# Patient Record
Sex: FEMALE | Race: BLACK OR AFRICAN AMERICAN | Employment: FULL TIME | URBAN - METROPOLITAN AREA
[De-identification: names, ages, dates, MRNs, and addresses within clinical notes are randomized per-mention and may not be internally consistent; named-entity substitution may affect disease eponyms.]

---

## 2020-06-22 ENCOUNTER — HOSPITAL ENCOUNTER (OUTPATIENT)
Dept: PHYSICAL THERAPY | Age: 50
Setting detail: THERAPIES SERIES
Discharge: HOME | End: 2020-06-22
Payer: COMMERCIAL

## 2020-06-22 PROCEDURE — 97110 THERAPEUTIC EXERCISES: CPT

## 2020-06-22 PROCEDURE — 97161 PT EVAL LOW COMPLEX 20 MIN: CPT

## 2020-06-23 NOTE — CONSULTS
[x] Demo  [x] Written  Comprehension of Education:  [x] Verbalizes understanding. [x] Demonstrates understanding. [x] Needs Review. [] Demonstrates/verbalizes understanding of HEP/Ed previously given. Treatment Plan:  [x] Therapeutic Exercise   45875  [x] Vasopneumatic cold with compression  97436    [x] Therapeutic Activity  52199 [x] Cold/hotpack    [x] Gait Training   66769 [] Lumbar/Cervical Traction  [id number]   [x] Neuromuscular Re-education  [id number] [] Electrical Stimulation Unattended  30976   [x] Manual Therapy    07030 [] Electrical Stimulation Attended  [id number]   [] Iontophoresis: 4 mg/mL Dexamethasone Sodium Phosphate  mAmin  83300 []  Medication allergies reviewed for use of   Dexamethasone Sodium Phosphate 4mg/ml with iontophoresis treatments. Pt is not allergic.        Frequency:  2x/week for 12 visits    Todays Treatment:  Precautions:  Modalities:   Exercises:  Exercise Reps/ Time Weight/ Level Comments   4 way ankle 10x blue Band issued   Step calf stretch  10x     Wall calf stretch  10x     R SLS 3x max                 Other:    Specific Instructions for next treatment: R ankle and foot strengthening, Gastroc mobility, balance training, manual ankle mobs      Evaluation Complexity:  History (Personal factors, comorbidities) [] 0 [x] 1-2 [] 3+   Exam (limitations, restrictions) [] 1-2 [x] 3 [] 4+   Clinical presentation (progression) [x] Stable [] Evolving  [] Unstable   Decision Making [x] Low [] Moderate [] High    [x] Low Complexity [] Moderate Complexity [] High Complexity       Treatment Charges: Mins Units   [x] Evaluation       [x]  Low       []  Moderate       []  High 25 1   []  Modalities     [x]  Ther Exercise 15 1   []  Manual Therapy     []  Ther Activities     []  Aquatics     []  Vasocompression     []  Other       TOTAL TREATMENT TIME: 15    Time in:1700   Time out:1740    Electronically signed by: Silvino Watt, PT        Physician

## 2020-06-25 ENCOUNTER — HOSPITAL ENCOUNTER (OUTPATIENT)
Dept: PHYSICAL THERAPY | Age: 50
Setting detail: THERAPIES SERIES
Discharge: HOME | End: 2020-06-25
Payer: COMMERCIAL

## 2020-06-25 PROCEDURE — 97110 THERAPEUTIC EXERCISES: CPT

## 2020-06-29 ENCOUNTER — HOSPITAL ENCOUNTER (OUTPATIENT)
Dept: PHYSICAL THERAPY | Age: 50
Setting detail: THERAPIES SERIES
Discharge: HOME | End: 2020-06-29
Payer: COMMERCIAL

## 2020-06-29 PROCEDURE — 97110 THERAPEUTIC EXERCISES: CPT

## 2020-06-29 NOTE — FLOWSHEET NOTE
[x] [facility]  [address]  [phone]  F: [phone]     Physical Therapy Daily Treatment Note    Date:  2020  Patient Name:  Rosa Elena Vicente      :  1970    MRN: [id number]  Physician:  Yi Mcdonough DO                       Insurance: [employer]  Medical Diagnosis: Pain of Right ankle joint M25.571                      Rehab Codes: M25.571, R26.2  Onset Date:6/10/20       Visit# / total visits: 3    Cancels/No Shows: 0/0    Subjective:    Pain:  [x] Yes  [] No Location: R ankle   Pain Rating: (0-10 scale) 4/10  Pain altered Tx:  [] No  [] Yes  Action:  Comments: medial ankle/foot pain noted. Objective:  Modalities:   Precautions:  Exercises:  Exercise Reps/ Time Weight/ Level Comments   SCIFIT 5 min            Standing       Gastroc stretch  On Wedge 3x30\"     Soleus stretch  3x30\" 6 inch On step    Calf raises 2x20x      SL BAPS 20x ea L2    Rockerboard 20x ea L2    SLS 5x max  Shoe off, avg 10-15 sec   SLS with March  20x  Shoe off          Mat      Towel scrunches 20x     Big toe extension  20x     Toes 2-5 extension  20x     4 way ankle  20x ea blue                Other:      Treatment Charges: Mins Units   []  Modalities     [x]  Ther Exercise 40 3   []  Manual Therapy     []  Ther Activities     []  Aquatics     []  Vasocompression     []  Other     Total Treatment time 40 3       Assessment: [x] Progressing toward goals. Some pain in medial ankle with single leg exercises. Dues to reinforce medial arch support, as pt is quite flat footed. Pt educated on use of orthotics for medial arch support. Given written instructions to obtain over the counter Power Step or similar product. [] No change. [] Other:  [x] Patient would continue to benefit from skilled physical therapy services in order to: improve Ankle stability to improve functional ambulation.      STG/LTG  STG: (to be met in 8 treatments)  1. ? Pain: 2/10 Right ankle pain with ambulation. 2. ? ROM: R ankle dorsiflexion to 20 degrees to improve gait kinematics. 3. ? Strength: 5/5 R ankle inversion to improve medial ankle support. 4. ? Function: FAAM score to 78% functional or better to improve ADLs. 5. Independent with Home Exercise Programs     LTG: (to be met in 12 treatments)  1. Patient will be able to ambulate 3 miles or more without foot/ankle pain. 2. R SLS of 20 seconds or better to demonstrate improves ankle stability.      Pt. Education:  [x] Yes  [] No  [x] Reviewed Prior HEP/Ed  Method of Education: [x] Verbal  [x] Demo  [] Written  Comprehension of Education:  [x] Verbalizes understanding. [x] Demonstrates understanding. [x] Needs review. [] Demonstrates/verbalizes HEP/Ed previously given. Plan: [x] Continue current frequency toward long and short term goals.     [x] Specific Instructions for subsequent treatments: add SL calf raises      Time In: 2525            Time Out: 2280      Electronically signed by:  Mariel Kaiser PTA

## 2020-07-01 ENCOUNTER — HOSPITAL ENCOUNTER (OUTPATIENT)
Dept: PHYSICAL THERAPY | Age: 50
Setting detail: THERAPIES SERIES
Discharge: HOME | End: 2020-07-01
Payer: COMMERCIAL

## 2020-07-01 PROCEDURE — 97110 THERAPEUTIC EXERCISES: CPT

## 2020-07-01 NOTE — FLOWSHEET NOTE
[x] [facility] [address]  [address]  [phone]  F: [phone]     Physical Therapy Daily Treatment Note    Date:  2020  Patient Name:  Jhonatan Mark      :  1970    MRN: [id number]  Physician:  Jose Mulligan DO                       Insurance: [employer]  Medical Diagnosis: Pain of Right ankle joint M25.571                      Rehab Codes: M25.571, R26.2  Onset Date:6/10/20       Visit# / total visits:     Cancels/No Shows: 0/0    Subjective:    Pain:  [x] Yes  [] No Location: R ankle   Pain Rating: (0-10 scale) 4/10  Pain altered Tx:  [] No  [] Yes  Action:  Comments: medial ankle/foot pain noted. Objective:  Modalities:   Precautions:  Exercises:  Exercise Reps/ Time Weight/ Level Comments   SCIFIT 5 min            Standing       Gastroc stretch  On Wedge 3x30\"     Soleus stretch  3x30\" 6 inch On step    Calf raises 2x20x      SL BAPS 20x ea L2    Rockerboard 20x ea L2    SLS 5x max  Shoe off, avg 10-15 sec   SLS with March  20x  Shoe off          Mat      Seated calf raise 2x20x     Towel scrunches 20x     Big toe extension  20x     Toes 2-5 extension  20x     4 way ankle  20x ea blue                Other:      Treatment Charges: Mins Units   []  Modalities     [x]  Ther Exercise 40 3   []  Manual Therapy     []  Ther Activities     []  Aquatics     []  Vasocompression     []  Other     Total Treatment time 40 3       Assessment: [x] Progressing toward goals. Fair tolerance to exercises. Calf raises are very weak, added seated version to improve soleus strength. [] No change. [] Other:  [x] Patient would continue to benefit from skilled physical therapy services in order to: improve Ankle stability to improve functional ambulation. STG/LTG  STG: (to be met in 8 treatments)  1. ? Pain: 2/10 Right ankle pain with ambulation. 2. ? ROM: R ankle dorsiflexion to 20 degrees to improve gait kinematics. 3. ? Strength: 5/5 R ankle inversion to improve medial ankle support. 4. ? Function: FAAM score to 78% functional or better to improve ADLs. 5. Independent with Home Exercise Programs     LTG: (to be met in 12 treatments)  1. Patient will be able to ambulate 3 miles or more without foot/ankle pain. 2. R SLS of 20 seconds or better to demonstrate improves ankle stability.      Pt. Education:  [x] Yes  [] No  [x] Reviewed Prior HEP/Ed  Method of Education: [x] Verbal  [x] Demo  [] Written  Comprehension of Education:  [x] Verbalizes understanding. [x] Demonstrates understanding. [x] Needs review. [] Demonstrates/verbalizes HEP/Ed previously given. Plan: [x] Continue current frequency toward long and short term goals.     [x] Specific Instructions for subsequent treatments: add SL calf raises      Time In: 1640           Time Out: 625 Deandre Peterson      Electronically signed by:  Meli Palencia, PT

## 2020-07-07 ENCOUNTER — HOSPITAL ENCOUNTER (OUTPATIENT)
Dept: PHYSICAL THERAPY | Age: 50
Setting detail: THERAPIES SERIES
Discharge: HOME | End: 2020-07-07
Payer: COMMERCIAL

## 2020-07-07 PROCEDURE — 97110 THERAPEUTIC EXERCISES: CPT

## 2020-07-07 NOTE — FLOWSHEET NOTE
[x] [facility] &  Therapy  [address]  [phone]  F: [phone]     Physical Therapy Daily Treatment Note    Date:  2020  Patient Name:  Pam Coto      :  1970    MRN: [id number]  Physician:  Keyla Riley DO                       Insurance: [employer]  Medical Diagnosis: Pain of Right ankle joint M25.571                      Rehab Codes: M25.571, R26.2  Onset Date:6/10/20       Visit# / total visits:     Cancels/No Shows: 0/0    Subjective:    Pain:  [x] Yes  [] No Location: R ankle   Pain Rating: (0-10 scale) 3/10  Pain altered Tx:  [] No  [] Yes  Action:  Comments: Patient reports improved ankle pain this date. Objective:  Modalities:   Precautions:   Exercises:  Exercise Reps/ Time Weight/ Level Comments   SCIFIT 5 min  L3           Standing       Gastroc stretch  On Wedge 3x30\"     Soleus stretch  3x30\" 6 inch On step    Calf raises 2x20x      SL BAPS 20x ea L2    Rockerboard 20x ea L2    SLS 5x max  Shoe off, avg 10-15 sec   SLS with March  20x  Shoe off    Power strides 15x 6 in    TG squats      TG SL calf raises                  Mat      Seated calf raise 2x20x     Towel scrunches 20x     Big toe extension  20x     Toes 2-5 extension  20x     4 way ankle  20x ea blue    Short foot 20x           Other:      Treatment Charges: Mins Units   []  Modalities     [x]  Ther Exercise 40 3   []  Manual Therapy     []  Ther Activities     []  Aquatics     []  Vasocompression     []  Other     Total Treatment time 40 3       Assessment: [x] Progressing toward goals. Patient demonstrates improved ankle strength during resistance band exercises. Patient demonstrates limited ankle control during power steps. [] No change. [] Other:  [x] Patient would continue to benefit from skilled physical therapy services in order to: improve Ankle stability to improve functional ambulation.      STG/LTG  STG: (to be met in 8 treatments)  1. ? Pain: 2/10 Right ankle pain with ambulation. 2. ? ROM: R ankle dorsiflexion to 20 degrees to improve gait kinematics. 3. ? Strength: 5/5 R ankle inversion to improve medial ankle support. 4. ? Function: FAAM score to 78% functional or better to improve ADLs. 5. Independent with Home Exercise Programs     LTG: (to be met in 12 treatments)  1. Patient will be able to ambulate 3 miles or more without foot/ankle pain. 2. R SLS of 20 seconds or better to demonstrate improves ankle stability.      Pt. Education:  [x] Yes  [] No  [x] Reviewed Prior HEP/Ed  Method of Education: [x] Verbal  [x] Demo  [] Written  Comprehension of Education:  [x] Verbalizes understanding. [x] Demonstrates understanding. [x] Needs review. [] Demonstrates/verbalizes HEP/Ed previously given. Plan: [x] Continue current frequency toward long and short term goals.     [x] Specific Instructions for subsequent treatments: add TG SL calf raises      Time In: 1300           Time Out: 1340    Electronically signed by:  Milan Sexton, PT

## 2020-07-09 ENCOUNTER — HOSPITAL ENCOUNTER (OUTPATIENT)
Dept: PHYSICAL THERAPY | Age: 50
Setting detail: THERAPIES SERIES
Discharge: HOME | End: 2020-07-09
Payer: COMMERCIAL

## 2020-07-09 PROCEDURE — 97110 THERAPEUTIC EXERCISES: CPT

## 2020-07-09 NOTE — FLOWSHEET NOTE
[x] [facility] &  Therapy  [address]  [phone]  F: [phone]     Physical Therapy Daily Treatment Note    Date:  2020  Patient Name:  Pat Hooker      :  1970    MRN: [id number]  Physician:  Hannah Wiseman DO                       Insurance: [employer]  Medical Diagnosis: Pain of Right ankle joint M25.571                      Rehab Codes: M25.571, R26.2  Onset Date:6/10/20       Visit# / total visits:     Cancels/No Shows: 0/0    Subjective:    Pain:  [x] Yes  [] No Location: R ankle   Pain Rating: (0-10 scale) 310  Pain altered Tx:  [] No  [] Yes  Action:  Comments: Patient reports only mild medial R ankle pain this date. Objective:  Modalities:   Precautions:   Exercises:  Exercise Reps/ Time Weight/ Level Comments   Elliptical 5 min  L1          Standing       Gastroc stretch  On Wedge 3x30\"     Soleus stretch  3x30\" 6 inch On step    Calf raises 2x20x      SL BAPS 20x ea L2    Rockerboard 20x ea L2    SLS 5x max  Shoe off, avg 10-15 sec   SLS with March  20x  Shoe off    Power strides 15x 6 in    TG squats 2x15x  @ 35 degrees   TG SL calf raises 2x15x  @ 25 degrees and 20 degrees               Mat      Seated calf raise 2x30x     Towel scrunches 20x     Big toe extension  20x     Toes 2-5 extension  20x     4 way ankle  20x ea blue    Short foot 20x           Other:      Treatment Charges: Mins Units   []  Modalities     [x]  Ther Exercise 40 3   []  Manual Therapy     []  Ther Activities     []  Aquatics     []  Vasocompression     []  Other     Total Treatment time 40 3       Assessment: [x] Progressing toward goals. Patient still struggles with actively gripping the ground with her feet in order to provide a stable BASHIR for her body to balance on.     [] No change.      [] Other:  [x] Patient would continue to benefit from skilled physical therapy services in order to: improve Ankle stability to improve functional ambulation. STG/LTG  STG: (to be met in 8 treatments)  1. ? Pain: 2/10 Right ankle pain with ambulation. 2. ? ROM: R ankle dorsiflexion to 20 degrees to improve gait kinematics. 3. ? Strength: 5/5 R ankle inversion to improve medial ankle support. 4. ? Function: FAAM score to 78% functional or better to improve ADLs. 5. Independent with Home Exercise Programs     LTG: (to be met in 12 treatments)  1. Patient will be able to ambulate 3 miles or more without foot/ankle pain. 2. R SLS of 20 seconds or better to demonstrate improves ankle stability.      Pt. Education:  [x] Yes  [] No  [x] Reviewed Prior HEP/Ed  Method of Education: [x] Verbal  [x] Demo  [] Written  Comprehension of Education:  [x] Verbalizes understanding. [x] Demonstrates understanding. [x] Needs review. [] Demonstrates/verbalizes HEP/Ed previously given. Plan: [x] Continue current frequency toward long and short term goals.     [x] Specific Instructions for subsequent treatments: add more SL balance       Time In: 1300           Time Out: 7375    Electronically signed by:  Joni Tony, PT

## 2020-07-13 ENCOUNTER — HOSPITAL ENCOUNTER (OUTPATIENT)
Dept: PHYSICAL THERAPY | Age: 50
Setting detail: THERAPIES SERIES
Discharge: HOME | End: 2020-07-13
Payer: COMMERCIAL

## 2020-07-13 PROCEDURE — 97110 THERAPEUTIC EXERCISES: CPT

## 2020-07-13 NOTE — FLOWSHEET NOTE
[x] [facility]) [facility] &  Therapy  [address]  [phone]  F: [phone]     Physical Therapy Daily Treatment Note    Date:  2020  Patient Name:  Daija Monroe      :  1970    MRN: [id number]  Physician:  Adrian Pascual DO                       Insurance: [email or web address] Benefits  Medical Diagnosis: Pain of Right ankle joint M25.571                      Rehab Codes: M25.571, R26.2  Onset Date:6/10/20       Visit# / total visits:     Cancels/No Shows: 0/0    Subjective:    Pain:  [x] Yes  [] No Location: R ankle   Pain Rating: (0-10 scale) 4-5/10  Pain altered Tx:  [] No  [] Yes  Action:  Comments:  Reports she is okay, \"I always have  pain in abner ankles\".  States she has  (declined elliptical, does not want to get hair sweaty before work)   Objective:  Modalities:  PRN  Precautions:   Exercises:  Exercise Reps/ Time Weight/ Level Comments   Elliptical   min  L1    Nu step  5 mins L4          Standing       Gastroc stretch  On Wedge 3x30\"     Soleus stretch  2x30\" 6 inch On step,    Calf raises 2x20x      SL BAPS 20x ea L2 Difficult with CCW, incr pain held after 10 reps    Rockerboard 20x ea L2    SLS 5x max  Shoe off, avg 10-15 sec   SLS with March  20x  Shoe off    Power strides 15x 6 in    TG squats 2x15x  @ 35 degrees   TG SL calf raises 2x15x  @ 25 degrees and 20 degrees         Mat      Seated calf raise 2x30x     gastroc stretch w/ inversion 2x1 min  added   Soleus stretch 3x30\"  Belt,    Towel scrunches 20x     Big toe extension  20x     Toes 2-5 extension  20x     4 way ankle  20x ea blue    Short foot 20x     Ankle inversion swooshes  20x  20x A  1 lbs Added  Added         Other: education on wearing foot wear with arch support VS FLIP FLOPS        Treatment Charges: Mins Units   []  Modalities     [x]  Ther Exercise  54 4   []  Manual Therapy     []  Ther Activities     []  Aquatics     []  Vasocompression     []  Other     Total Treatment time  54 4       Assessment: [x] Progressing toward goals verbal cues for technique with squats. Added  Inversion PRE and gastroc stretching with inversion. [] No change. [x] Other: degreased static and dynamic stability, decreased tolerance to ckc solus stretching. Addressed solus  with belt OKC with improved tolerance this date. . [x] Patient would continue to benefit from skilled physical therapy services in order to: improve Ankle stability to improve functional ambulation. STG/LTG  STG: (to be met in 8 treatments)  1. ? Pain: 2/10 Right ankle pain with ambulation. 2. ? ROM: R ankle dorsiflexion to 20 degrees to improve gait kinematics. 3. ? Strength: 5/5 R ankle inversion to improve medial ankle support. 4. ? Function: FAAM score to 78% functional or better to improve ADLs. 5. Independent with Home Exercise Programs     LTG: (to be met in 12 treatments)  1. Patient will be able to ambulate 3 miles or more without foot/ankle pain. 2. R SLS of 20 seconds or better to demonstrate improves ankle stability.      Pt. Education:  [x] Yes  [] No  [x] Reviewed Prior HEP/Ed  Method of Education: [x] Verbal  [x] Demo  [] Written  Comprehension of Education:  [x] Verbalizes understanding. [x] Demonstrates understanding. [x] Needs review. [] Demonstrates/verbalizes HEP/Ed previously given. Plan: [x] Continue current frequency toward long and short term goals. [x] Specific Instructions for subsequent treatments: add more SL balance REASSESSMENT NEXT VISIT.        Time In:  3671         Time Out:  3742    Electronically signed by:  Marta Queen PTA

## 2020-07-14 ENCOUNTER — APPOINTMENT (OUTPATIENT)
Dept: PHYSICAL THERAPY | Age: 50
End: 2020-07-14
Payer: COMMERCIAL

## 2020-07-16 ENCOUNTER — HOSPITAL ENCOUNTER (OUTPATIENT)
Dept: PHYSICAL THERAPY | Age: 50
Setting detail: THERAPIES SERIES
Discharge: HOME | End: 2020-07-16
Payer: COMMERCIAL

## 2020-07-16 PROCEDURE — 97110 THERAPEUTIC EXERCISES: CPT

## 2020-07-16 NOTE — FLOWSHEET NOTE
[x] [facility] &  Therapy  [address]  [phone]  F: [phone]     Physical Therapy Daily Treatment Note/ Progress Note    Date:  2020  Patient Name:  Inna Hensley      :  1970    MRN: [id number]  Physician:  Jerry Medina DO                       Insurance: [employer]  Medical Diagnosis: Pain of Right ankle joint M25.571                      Rehab Codes: M25.571, R26.2  Onset Date:6/10/20       Visit# / total visits:     Cancels/No Shows: 0/0    Subjective:    Pain:  [x] Yes  [] No Location: R ankle   Pain Rating: (0-10 scale) 4/10  Pain altered Tx:  [] No  [] Yes  Action:  Comments:  Patient reports ankle and foot tightness this am. Needs to be out at 8:15 for work  Objective:  Modalities:  PRN  Precautions:   Exercises:  Exercise Reps/ Time Weight/ Level Comments   Elliptical   min  L1    Nu step  5 mins L4          Standing       Gastroc stretch  On Wedge 3x30\"     Soleus stretch  2x30\" 6 inch On step,    Calf raises w/ ball 2x20x      SL BAPS 20x ea L2 Difficult with CCW, incr pain held after 10 reps    Rockerboard 30x ea L2    SLS 5x max  Shoe off, avg 10-15 sec   SLS with March  20x  Shoe off    Power strides 15x 6 in    Leg press 20x  @ 35 degrees   Leg press calf raises 20x  @ 25 degrees and 20 degrees         Mat      Seated calf raise 2x30x     gastroc stretch w/ inversion 2x1 min  Held   Soleus stretch 3x30\"  Belt,    Towel scrunches 20x     Big toe extension  20x     Toes 2-5 extension  20x     4 way ankle  20x ea blue    Short foot 20x     Ankle inversion swooshes  20x  20x A  1 lbs Added  Added         Other:        Treatment Charges: Mins Units   []  Modalities     [x]  Ther Exercise  45 3   []  Manual Therapy     []  Ther Activities     []  Aquatics     []  Vasocompression     []  Other     Total Treatment time  45 3       Assessment: [x] Progressing toward goals verbal cues for technique with squats.

## 2020-07-17 ENCOUNTER — APPOINTMENT (OUTPATIENT)
Dept: PHYSICAL THERAPY | Age: 50
End: 2020-07-17
Payer: COMMERCIAL

## 2020-07-21 ENCOUNTER — HOSPITAL ENCOUNTER (OUTPATIENT)
Dept: PHYSICAL THERAPY | Age: 50
Setting detail: THERAPIES SERIES
Discharge: HOME | End: 2020-07-21
Payer: COMMERCIAL

## 2020-07-21 NOTE — FLOWSHEET NOTE
[x] [facility]) [facility] &  Therapy  [address]    [phone]  F: [phone]   [] [address] [facility] [address]   [address]  P: [phone]  F: [phone]  [] [place]  [address] [facility] [facility]  P: [phone]  F: [phone]  [] [address]  [facility]   [address]   [place]: [phone]  F: [phone]   [] [address]  [place]: [phone]   F: [phone]     Physical Therapy Cancel/No Show note    Date: 2020  Patient: Marty Gibbs  : 1970  MRN: [id number]    Cancels/No Shows to date:     For today's appointment patient:    [x]  Cancelled    [] Rescheduled appointment    [] No-show     Reason given by patient:    []  Patient ill    []  Conflicting appointment    [] No transportation      [] Conflict with work    [] No reason given    [] Weather related    [] [id number]    [] Other:      Comments:        [] Next appointment was confirmed    Electronically signed by: Parviz Corral PT

## 2020-07-23 ENCOUNTER — HOSPITAL ENCOUNTER (OUTPATIENT)
Dept: PHYSICAL THERAPY | Age: 50
Setting detail: THERAPIES SERIES
Discharge: HOME | End: 2020-07-23
Payer: COMMERCIAL

## 2020-07-23 PROCEDURE — 97110 THERAPEUTIC EXERCISES: CPT

## 2020-07-23 NOTE — FLOWSHEET NOTE
[x] [facility] &  Therapy  [address]  [phone]  F: [phone]     Physical Therapy Daily Treatment Note/ Progress Note    Date:  2020  Patient Name:  Mesha Ward      :  1970    MRN: [id number]  Physician:  Angella Lanier DO                       Insurance: [employer] Benefits  Medical Diagnosis: Pain of Right ankle joint M25.571                      Rehab Codes: M25.571, R26.2  Onset Date:6/10/20       Visit# / total visits:     Cancels/No Shows: 0/0    Subjective:    Pain:  [x] Yes  [] No Location: R ankle   Pain Rating: (0-10 scale) 3/10  Pain altered Tx:  [] No  [] Yes  Action:  Comments:  Patient reports improved foot pain today, no ankle pain. Objective:  Modalities:  PRN  Precautions:   Exercises:  Exercise Reps/ Time Weight/ Level Comments   Elliptical   min  L1    Nu step  5 mins L4          Standing       Gastroc stretch  On Wedge 3x30\"     Soleus stretch  2x30\" 6 inch On step,    Calf raises w/ ball 2x20x      SL BAPS 20x ea L3 Difficult with CCW, incr pain held after 10 reps    Rockerboard 30x ea L2    SLS 5x max  Shoe off, avg 10-15 sec   SLS with March  20x  Shoe off    SLS 3 way hip       Power strides 20x 6 in    Leg press 20x     Leg press calf raises 20x           Mat      Seated calf raise 2x30x     gastroc stretch w/ inversion 2x1 min  Held   Soleus stretch 3x30\"  Belt,    Towel scrunches 20x     Big toe extension  20x     Toes 2-5 extension  20x     4 way ankle  20x ea blue    Short foot 20x     Ankle inversion swooshes  20x  20x A  1 lbs Added  Added         Other:        Treatment Charges: Mins Units   []  Modalities     [x]  Ther Exercise  40 3   []  Manual Therapy     []  Ther Activities     []  Aquatics     []  Vasocompression     []  Other     Total Treatment time  40 3       Assessment: [x] Progressing toward goals verbal cues for technique with squats.   Patient continues to progress in PT , minimal pain with calf raises and resisted inversion today. Advised to focus on SLS for HEP. [] No change. [x] Other:  FAAM: 66% functional, ROM R dorsiflexion 0-20 degrees. She self rates her current level of function at 85%. . [x] Patient would continue to benefit from skilled physical therapy services in order to: improve Ankle stability to improve functional ambulation. STG/LTG  STG: (to be met in 8 treatments)  1. ? Pain: 2/10 Right ankle pain with ambulation. Not Met   2. ? ROM: R ankle dorsiflexion to 20 degrees to improve gait kinematics. Met   3. ? Strength: 5/5 R ankle inversion to improve medial ankle support. Not Met  4. ? Function: FAAM score to 78% functional or better to improve ADLs. Not Met  5. Independent with Home Exercise Programs Met     LTG: (to be met in 12 treatments)  1. Patient will be able to ambulate 3 miles or more without foot/ankle pain. 2. R SLS of 20 seconds or better to demonstrate improves ankle stability.      Pt. Education:  [x] Yes  [] No  [x] Reviewed Prior HEP/Ed  Method of Education: [x] Verbal  [x] Demo  [] Written  Comprehension of Education:  [x] Verbalizes understanding. [x] Demonstrates understanding. [x] Needs review. [] Demonstrates/verbalizes HEP/Ed previously given. Plan: [x] Continue current frequency toward long and short term goals.     [x] Specific Instructions for subsequent treatments: add more SL balance, trial hawk  to PTT      Time In:  1300       Time Out:  1340    Electronically signed by:  Becca aTpia PT

## 2020-07-28 ENCOUNTER — HOSPITAL ENCOUNTER (OUTPATIENT)
Dept: PHYSICAL THERAPY | Age: 50
Setting detail: THERAPIES SERIES
Discharge: HOME | End: 2020-07-28
Payer: COMMERCIAL

## 2020-07-28 PROCEDURE — 97110 THERAPEUTIC EXERCISES: CPT

## 2020-07-28 NOTE — FLOWSHEET NOTE
struggling maintaining SLS especially with contralateral limb movement. [] No change. [x] Other:  FAAM: 66% functional, ROM R dorsiflexion 0-20 degrees. She self rates her current level of function at 85%. . [x] Patient would continue to benefit from skilled physical therapy services in order to: improve Ankle stability to improve functional ambulation. STG/LTG  STG: (to be met in 8 treatments)  1. ? Pain: 2/10 Right ankle pain with ambulation. Not Met   2. ? ROM: R ankle dorsiflexion to 20 degrees to improve gait kinematics. Met   3. ? Strength: 5/5 R ankle inversion to improve medial ankle support. Not Met  4. ? Function: FAAM score to 78% functional or better to improve ADLs. Not Met  5. Independent with Home Exercise Programs Met     LTG: (to be met in 12 treatments)  1. Patient will be able to ambulate 3 miles or more without foot/ankle pain. 2. R SLS of 20 seconds or better to demonstrate improves ankle stability.      Pt. Education:  [x] Yes  [] No  [x] Reviewed Prior HEP/Ed  Method of Education: [x] Verbal  [x] Demo  [] Written  Comprehension of Education:  [x] Verbalizes understanding. [x] Demonstrates understanding. [x] Needs review. [] Demonstrates/verbalizes HEP/Ed previously given. Plan: [x] Continue current frequency toward long and short term goals.     [x] Specific Instructions for subsequent treatments: add more SL balance      Time In:  1435       Time Out:  4021    Electronically signed by:  Isabella Cobb, PT

## 2020-07-30 ENCOUNTER — HOSPITAL ENCOUNTER (OUTPATIENT)
Dept: PHYSICAL THERAPY | Age: 50
Setting detail: THERAPIES SERIES
Discharge: HOME | End: 2020-07-30
Payer: COMMERCIAL

## 2020-07-30 PROCEDURE — 97110 THERAPEUTIC EXERCISES: CPT

## 2020-07-30 NOTE — FLOWSHEET NOTE
[x] [facility]  [address]  [phone]  F: [phone]     Physical Therapy Daily Treatment Note    Date:  2020  Patient Name:  Grace Mccall      :  1970    MRN: [id number]  Physician:  Marysol Jo DO                       Insurance: [employer]  Medical Diagnosis: Pain of Right ankle joint M25.571                      Rehab Codes: M25.571, R26.2  Onset Date:6/10/20       Visit# / total visits:     Cancels/No Shows: 0/0    Subjective:    Pain:  [x] Yes  [] No Location: R foot/ankle   Pain Rating: (0-10 scale) 4/10  Pain altered Tx:  [] No  [] Yes  Action:  Comments:  Patient reports Increased R foot pain today    Objective:  Modalities:  PRN  Precautions:   Exercises:  Exercise Reps/ Time Weight/ Level Comments   Elliptical   min  L1    Nu step  5 mins L4          Standing       Gastroc stretch  On Wedge 3x30\"     Soleus stretch  2x30\" 6 inch On step,    Calf raises w/ ball 2x20x      SL BAPS 20x ea L3 Difficult with CCW, incr pain held after 10 reps    Rockerboard 30x ea L2    SLS 5x max  Shoe off, avg 10-15 sec   SLS with March  20x  Shoe off    SLS 3 way hip  20x     Power strides 20x 6 in    Leg press 20x 60 lb    Leg press calf raises 20x 60 lb           Mat      Seated calf raise 2x30x     gastroc stretch w/ inversion 2x1 min  Held   Soleus stretch 3x30\"  Belt,    Towel scrunches 20x     Big toe extension  20x     Toes 2-5 extension  20x     4 way ankle  20x ea blue    Short foot 20x     Ankle inversion swooshes  20x  20x A  1 lbs Added  Added         Other:        Treatment Charges: Mins Units   []  Modalities     [x]  Ther Exercise  40 3   []  Manual Therapy     []  Ther Activities     []  Aquatics     []  Vasocompression     []  Other     Total Treatment time  40 3       Assessment: [x] Progressing toward goals verbal cues for technique with squats. Good exercise technique throughout session.  Still

## 2020-08-05 ENCOUNTER — HOSPITAL ENCOUNTER (OUTPATIENT)
Dept: PHYSICAL THERAPY | Age: 50
Setting detail: THERAPIES SERIES
Discharge: HOME | End: 2020-08-05
Payer: COMMERCIAL

## 2020-08-05 PROCEDURE — 97110 THERAPEUTIC EXERCISES: CPT

## 2020-08-05 NOTE — FLOWSHEET NOTE
[x] [facility]  [address]  [phone]  F: [phone]     Physical Therapy Daily Treatment Note    Date:  2020  Patient Name:  Bhavin Samson      :  1970    MRN: [id number]  Physician:  Marry Negron DO                       Insurance: [employer]  Medical Diagnosis: Pain of Right ankle joint M25.571                      Rehab Codes: M25.571, R26.2  Onset Date:6/10/20       Visit# / total visits:     Cancels/No Shows: 0/0    Subjective:    Pain:  [x] Yes  [] No Location: R foot/ankle   Pain Rating: (0-10 scale) 5/10  Pain altered Tx:  [] No  [] Yes  Action:  Comments:  Patient reports Increased R foot pain today    Objective:  Modalities:  PRN  Precautions:   Exercises:  Exercise Reps/ Time Weight/ Level Comments   Elliptical   min  L1    Nu step  5 mins L4          Standing       Gastroc stretch  On Wedge 3x30\"     Soleus stretch  2x30\" 6 inch On step,    Calf raises w/ ball 2x20x      SL BAPS 20x ea L3 Difficult with CCW, incr pain held after 10 reps    Rockerboard 30x ea L2    SLS 5x max  Shoe off, avg 10-15 sec   SLS with March  20x  Shoe off    SLS 3 way hip  20x     Power strides 20x 6 in    Leg press 20x 60 lb    Leg press calf raises 20x 60 lb           Mat      Seated calf raise 2x30x     gastroc stretch w/ inversion 2x1 min  Held   Soleus stretch 3x30\"  Belt,    Towel scrunches 20x     Big toe extension  20x     Toes 2-5 extension  20x     4 way ankle  20x ea blue    Short foot 20x     Ankle inversion swooshes  20x  20x A  1 lbs Added  Added         Other:        Treatment Charges: Mins Units   []  Modalities     [x]  Ther Exercise  40 3   []  Manual Therapy     []  Ther Activities     []  Aquatics     []  Vasocompression     []  Other     Total Treatment time  40 3       Assessment: [x] Progressing toward goals verbal cues for technique with squats. Good exercise technique throughout session.  Still lacks adequate ankle stability, SLS exercise still difficult to complete. [] No change. [x] Other:  FAAM: 66% functional, ROM R dorsiflexion 0-20 degrees. She self rates her current level of function at 85%. . [x] Patient would continue to benefit from skilled physical therapy services in order to: improve Ankle stability to improve functional ambulation. STG/LTG  STG: (to be met in 8 treatments)  1. ? Pain: 2/10 Right ankle pain with ambulation. Not Met   2. ? ROM: R ankle dorsiflexion to 20 degrees to improve gait kinematics. Met   3. ? Strength: 5/5 R ankle inversion to improve medial ankle support. Met  4. ? Function: FAAM score to 78% functional or better to improve ADLs. Not Met  5. Independent with Home Exercise Programs Met     LTG: (to be met in 12 treatments)  1. Patient will be able to ambulate 3 miles or more without foot/ankle pain. Not Met 1.5 mile max  2. R SLS of 20 seconds or better to demonstrate improves ankle stability. Met     Pt. Education:  [x] Yes  [] No  [x] Reviewed Prior HEP/Ed  Method of Education: [x] Verbal  [x] Demo  [] Written  Comprehension of Education:  [x] Verbalizes understanding. [x] Demonstrates understanding. [x] Needs review. [] Demonstrates/verbalizes HEP/Ed previously given. Plan: [x] Continue current frequency toward long and short term goals.     [x] Specific Instructions for subsequent treatments: add more SL balance      Time In:  1640      Time Out:  5533    Electronically signed by:  Becca Acosta, PT

## 2020-08-05 NOTE — DISCHARGE SUMMARY
R ankle inversion to improve medial ankle support. Met  4. ? Function: FAAM score to 78% functional or better to improve ADLs. Not Met  5. Independent with Home Exercise Programs Met     LTG: (to be met in 12 treatments)  1. Patient will be able to ambulate 3 miles or more without foot/ankle pain. Not Met   2. R SLS of 20 seconds or better to demonstrate improves ankle stability. Met    Treatment to Date:  [x] Therapeutic Exercise    [] Modalities:  [x] Therapeutic Activity     [] Ultrasound  [] Electrical Stimulation  [] Gait Training     [] Massage       [] Lumbar/Cervical Traction  [x] Neuromuscular Re-education [] Cold/hotpack [] Iontophoresis: 4 mg/mL  [x] Instruction in Home Exercise Program                     Dexamethasone Sodium  [] Manual Therapy             Phosphate 40-80 mAmin  [] Aquatic Therapy                   [] Vasocompression/    [] Other:             Game Ready    Discharge Status:     [] Pt recovered from conditions. Treatment goals were met. [x] Pt received maximum benefit. No further therapy indicated at this time. [x] Pt to continue exercise/home instructions independently. [] Therapy interrupted due to:    [] Pt has 2 or more no shows/cancels, is discontinued per our policy. [] Pt has completed prescribed number of treatment sessions. [] Other:         Electronically signed by Becca Acosta PT on 8/5/2020 at 5:24 PM      If you have any questions or concerns, please don't hesitate to call.   Thank you for your referral.

## 2023-05-09 ENCOUNTER — APPOINTMENT (OUTPATIENT)
Dept: GENERAL RADIOLOGY | Age: 53
End: 2023-05-09
Payer: COMMERCIAL

## 2023-05-09 ENCOUNTER — HOSPITAL ENCOUNTER (EMERGENCY)
Age: 53
Discharge: HOME | End: 2023-05-09
Attending: EMERGENCY MEDICINE
Payer: COMMERCIAL

## 2023-05-09 ENCOUNTER — APPOINTMENT (OUTPATIENT)
Dept: CT IMAGING | Age: 53
End: 2023-05-09
Payer: COMMERCIAL

## 2023-05-09 VITALS
BODY MASS INDEX: 44.98 KG/M2 | OXYGEN SATURATION: 98 % | HEART RATE: 66 BPM | WEIGHT: 270 LBS | RESPIRATION RATE: 19 BRPM | DIASTOLIC BLOOD PRESSURE: 74 MMHG | HEIGHT: 65 IN | TEMPERATURE: 97.9 F | SYSTOLIC BLOOD PRESSURE: 123 MMHG

## 2023-05-09 DIAGNOSIS — R07.9 CHEST PAIN, UNSPECIFIED TYPE: Primary | ICD-10-CM

## 2023-05-09 DIAGNOSIS — G44.209 ACUTE NON INTRACTABLE TENSION-TYPE HEADACHE: ICD-10-CM

## 2023-05-09 LAB
ABSOLUTE EOS #: 0.08 K/UL (ref 0–0.44)
ABSOLUTE IMMATURE GRANULOCYTE: 0.01 K/UL (ref 0–0.3)
ABSOLUTE LYMPH #: 2.05 K/UL (ref 1.1–3.7)
ABSOLUTE MONO #: 0.48 K/UL (ref 0.1–1.2)
ANION GAP SERPL CALCULATED.3IONS-SCNC: 7 MMOL/L (ref 9–17)
BASOPHILS # BLD: 1 % (ref 0–2)
BASOPHILS ABSOLUTE: 0.03 K/UL (ref 0–0.2)
BUN SERPL-MCNC: 13 MG/DL (ref 6–20)
BUN/CREAT BLD: 19 (ref 9–20)
CALCIUM SERPL-MCNC: 8.9 MG/DL (ref 8.6–10.4)
CHLORIDE SERPL-SCNC: 105 MMOL/L (ref 98–107)
CO2 SERPL-SCNC: 29 MMOL/L (ref 20–31)
CREAT SERPL-MCNC: 0.7 MG/DL (ref 0.5–0.9)
EOSINOPHILS RELATIVE PERCENT: 1 % (ref 1–4)
GFR SERPL CREATININE-BSD FRML MDRD: >60 ML/MIN/1.73M2
GLUCOSE SERPL-MCNC: 94 MG/DL (ref 70–99)
HCG QUALITATIVE: NEGATIVE
HCT VFR BLD AUTO: 39.5 % (ref 36.3–47.1)
HGB BLD-MCNC: 12.4 G/DL (ref 11.9–15.1)
IMMATURE GRANULOCYTES: 0 %
LYMPHOCYTES # BLD: 34 % (ref 24–43)
MAGNESIUM SERPL-MCNC: 2 MG/DL (ref 1.6–2.6)
MCH RBC QN AUTO: 29 PG (ref 25.2–33.5)
MCHC RBC AUTO-ENTMCNC: 31.4 G/DL (ref 28.4–34.8)
MCV RBC AUTO: 92.5 FL (ref 82.6–102.9)
MONOCYTES # BLD: 8 % (ref 3–12)
NRBC AUTOMATED: 0 PER 100 WBC
PDW BLD-RTO: 14.6 % (ref 11.8–14.4)
PLATELET # BLD AUTO: 255 K/UL (ref 138–453)
PMV BLD AUTO: 10.4 FL (ref 8.1–13.5)
POTASSIUM SERPL-SCNC: 4.1 MMOL/L (ref 3.7–5.3)
RBC # BLD: 4.27 M/UL (ref 3.95–5.11)
RBC # BLD: ABNORMAL 10*6/UL
SEG NEUTROPHILS: 56 % (ref 36–65)
SEGMENTED NEUTROPHILS ABSOLUTE COUNT: 3.35 K/UL (ref 1.5–8.1)
SODIUM SERPL-SCNC: 141 MMOL/L (ref 135–144)
TROPONIN I SERPL DL<=0.01 NG/ML-MCNC: <6 NG/L (ref 0–14)
TROPONIN I SERPL DL<=0.01 NG/ML-MCNC: <6 NG/L (ref 0–14)
WBC # BLD AUTO: 6 K/UL (ref 3.5–11.3)

## 2023-05-09 PROCEDURE — 99285 EMERGENCY DEPT VISIT HI MDM: CPT

## 2023-05-09 PROCEDURE — 36415 COLL VENOUS BLD VENIPUNCTURE: CPT

## 2023-05-09 PROCEDURE — 93005 ELECTROCARDIOGRAM TRACING: CPT | Performed by: EMERGENCY MEDICINE

## 2023-05-09 PROCEDURE — 71045 X-RAY EXAM CHEST 1 VIEW: CPT

## 2023-05-09 PROCEDURE — 84484 ASSAY OF TROPONIN QUANT: CPT

## 2023-05-09 PROCEDURE — 80048 BASIC METABOLIC PNL TOTAL CA: CPT

## 2023-05-09 PROCEDURE — 83735 ASSAY OF MAGNESIUM: CPT

## 2023-05-09 PROCEDURE — 70450 CT HEAD/BRAIN W/O DYE: CPT

## 2023-05-09 PROCEDURE — 85025 COMPLETE CBC W/AUTO DIFF WBC: CPT

## 2023-05-09 PROCEDURE — 84703 CHORIONIC GONADOTROPIN ASSAY: CPT

## 2023-05-09 RX ORDER — LEVOTHYROXINE SODIUM 0.15 MG/1
TABLET ORAL
COMMUNITY
Start: 2023-04-12

## 2023-05-09 RX ORDER — MELOXICAM 15 MG/1
TABLET ORAL
COMMUNITY
Start: 2023-04-11

## 2023-05-09 ASSESSMENT — ENCOUNTER SYMPTOMS
VOMITING: 0
EYE REDNESS: 0
RHINORRHEA: 0
SORE THROAT: 0
COLOR CHANGE: 0
NAUSEA: 0
DIARRHEA: 0
SHORTNESS OF BREATH: 0
COUGH: 0
EYE DISCHARGE: 0

## 2023-05-09 ASSESSMENT — HEART SCORE: ECG: 0

## 2023-05-09 ASSESSMENT — PAIN - FUNCTIONAL ASSESSMENT: PAIN_FUNCTIONAL_ASSESSMENT: 0-10

## 2023-05-09 ASSESSMENT — PAIN SCALES - GENERAL: PAINLEVEL_OUTOF10: 2

## 2023-05-09 NOTE — ED PROVIDER NOTES
Pt was transferred to her vehicle with her discharge instructions. ultrasound images (except ED bedside ultrasound) are read by the radiologist, see reports below, unless otherwise noted in MDM or here. Reports below are reviewed by myself. CT HEAD WO CONTRAST   Final Result   No acute intracranial abnormality. No acute territorial infarction,   intracranial hemorrhage or mass lesion. XR CHEST PORTABLE   Final Result   Unremarkable portable chest radiograph. LABS: Lab orders shown below, the results are reviewed by myself, and all abnormals are listed below. Labs Reviewed   BASIC METABOLIC PANEL - Abnormal; Notable for the following components:       Result Value    Anion Gap 7 (*)     All other components within normal limits   CBC WITH AUTO DIFFERENTIAL - Abnormal; Notable for the following components:    RDW 14.6 (*)     All other components within normal limits   MAGNESIUM   TROPONIN   TROPONIN   HCG, SERUM, QUALITATIVE       Vitals Reviewed:    Vitals:    05/09/23 2106 05/09/23 2108 05/09/23 2153 05/09/23 2201   BP:       Pulse: 57 62 67 68   Resp: 17 13 19 21   Temp:       TempSrc:       SpO2: 100% 98% 98% 98%   Weight:       Height:         MEDICATIONS GIVEN TO PATIENT THIS ENCOUNTER:  No orders of the defined types were placed in this encounter. DISCHARGE PRESCRIPTIONS:  New Prescriptions    No medications on file     PHYSICIAN CONSULTS ORDERED THIS ENCOUNTER:  None  FINAL IMPRESSION      1. Chest pain, unspecified type    2.  Acute non intractable tension-type headache          DISPOSITION/PLAN   DISPOSITION Decision To Discharge 05/09/2023 11:02:40 PM      OUTPATIENT FOLLOW UP THE PATIENT:  Donna Dawn  [address] ,[address] [facility]  [phone]    Schedule an appointment as soon as possible for a visit in 1 day        MD Killian Fernandez MD  05/09/23 4842

## 2023-05-10 LAB
EKG ATRIAL RATE: 57 BPM
EKG P AXIS: 61 DEGREES
EKG P-R INTERVAL: 148 MS
EKG Q-T INTERVAL: 426 MS
EKG QRS DURATION: 86 MS
EKG QTC CALCULATION (BAZETT): 414 MS
EKG R AXIS: 15 DEGREES
EKG T AXIS: 17 DEGREES
EKG VENTRICULAR RATE: 57 BPM

## 2023-05-10 PROCEDURE — 93010 ELECTROCARDIOGRAM REPORT: CPT | Performed by: INTERNAL MEDICINE
